# Patient Record
Sex: FEMALE | Race: WHITE
[De-identification: names, ages, dates, MRNs, and addresses within clinical notes are randomized per-mention and may not be internally consistent; named-entity substitution may affect disease eponyms.]

---

## 2019-10-22 ENCOUNTER — HOSPITAL ENCOUNTER (OUTPATIENT)
Dept: HOSPITAL 33 - SDC | Age: 69
Discharge: HOME | End: 2019-10-22
Attending: FAMILY MEDICINE
Payer: MEDICARE

## 2019-10-22 VITALS — SYSTOLIC BLOOD PRESSURE: 151 MMHG | OXYGEN SATURATION: 99 % | DIASTOLIC BLOOD PRESSURE: 77 MMHG

## 2019-10-22 VITALS — HEART RATE: 66 BPM

## 2019-10-22 DIAGNOSIS — K57.30: ICD-10-CM

## 2019-10-22 DIAGNOSIS — Z12.11: Primary | ICD-10-CM

## 2019-10-22 DIAGNOSIS — K52.9: ICD-10-CM

## 2019-10-22 NOTE — OP
SURGERY DATE/TIME:  10/22/2019  0753    



PREOPERATIVE DIAGNOSIS:      Positive Cologuard. 



POSTOPERATIVE DIAGNOSIS:      Mild colitis sigmoid colon and scattered sigmoid 
diverticula. 



PROCEDURE:    Colonoscopy with cold forceps biopsy.



SURGEON:        Dr. Lombardi.



ANESTHESIA:    Medications given by anesthesia department. 



HISTORY: The patient is a 69 year-old white female presenting now for history of positive 
Cologuard. The patient had previous colonoscopy five years ago which was normal. The 
patient was felt the need to have endoscopic evaluation. She was appraised of the risks of 
the procedure including the risk of perforation, phlebitis, untoward reaction to 
medication, bleeding and missed lesions. The patient verbalized her understanding and 
desired to have the procedure performed.



DESCRIPTION OF PROCEDURE: The patient was given the medications by the anesthesia 
department. She had continuous pulse oximetry, ECG monitoring, intermittent blood pressure 
monitoring and tidal CO2 monitoring during the examination. She was placed in the left 
lateral decubitus position. A digital rectal examination was performed and revealed normal 
anal sphincter tone and no masses. The flexible Olympus pediatric colonoscope was used to 
intubate the rectum. A view of the colon was developed sequentially to the cecum.  Upon 
insertion and withdrawal, there was noted an area near the first curve in the sigmoid 
colon which appeared to be somewhat villous in appearance and this was biopsied to rule 
out any adenomatous change. There was also noted to be some scattered sigmoid diverticula 
throughout the sigmoid colon. The scope was removed from the patient who tolerated the 
procedure well and was sent back to OP recovery in good condition. The prep was noted to 
be fair to good.